# Patient Record
Sex: FEMALE | Race: WHITE | URBAN - METROPOLITAN AREA
[De-identification: names, ages, dates, MRNs, and addresses within clinical notes are randomized per-mention and may not be internally consistent; named-entity substitution may affect disease eponyms.]

---

## 2022-04-20 ENCOUNTER — APPOINTMENT (RX ONLY)
Dept: URBAN - METROPOLITAN AREA CLINIC 341 | Facility: CLINIC | Age: 62
Setting detail: DERMATOLOGY
End: 2022-04-20

## 2022-04-20 DIAGNOSIS — L50.8 OTHER URTICARIA: ICD-10-CM | Status: INADEQUATELY CONTROLLED

## 2022-04-20 DIAGNOSIS — L82.1 OTHER SEBORRHEIC KERATOSIS: ICD-10-CM

## 2022-04-20 DIAGNOSIS — L73.8 OTHER SPECIFIED FOLLICULAR DISORDERS: ICD-10-CM

## 2022-04-20 PROCEDURE — ? PRESCRIPTION

## 2022-04-20 PROCEDURE — 99204 OFFICE O/P NEW MOD 45 MIN: CPT

## 2022-04-20 PROCEDURE — ? COUNSELING

## 2022-04-20 PROCEDURE — ? PRESCRIPTION MEDICATION MANAGEMENT

## 2022-04-20 PROCEDURE — ? TREATMENT REGIMEN

## 2022-04-20 RX ORDER — TRIAMCINOLONE ACETONIDE 1 MG/G
CREAM TOPICAL
Qty: 80 | Refills: 3 | Status: ERX | COMMUNITY
Start: 2022-04-20

## 2022-04-20 RX ADMIN — TRIAMCINOLONE ACETONIDE: 1 CREAM TOPICAL at 00:00

## 2022-04-20 ASSESSMENT — LOCATION SIMPLE DESCRIPTION DERM
LOCATION SIMPLE: LEFT FOREHEAD
LOCATION SIMPLE: LEFT FOREARM
LOCATION SIMPLE: RIGHT FOREHEAD
LOCATION SIMPLE: LEFT CHEEK
LOCATION SIMPLE: RIGHT UPPER ARM
LOCATION SIMPLE: RIGHT NOSE
LOCATION SIMPLE: ABDOMEN
LOCATION SIMPLE: LEFT UPPER ARM
LOCATION SIMPLE: RIGHT FOREARM

## 2022-04-20 ASSESSMENT — LOCATION DETAILED DESCRIPTION DERM
LOCATION DETAILED: LEFT CENTRAL MALAR CHEEK
LOCATION DETAILED: RIGHT FOREHEAD
LOCATION DETAILED: LEFT VENTRAL PROXIMAL FOREARM
LOCATION DETAILED: LEFT LATERAL ABDOMEN
LOCATION DETAILED: RIGHT NASAL SIDEWALL
LOCATION DETAILED: RIGHT ANTERIOR DISTAL UPPER ARM
LOCATION DETAILED: LEFT ANTERIOR DISTAL UPPER ARM
LOCATION DETAILED: LEFT FOREHEAD
LOCATION DETAILED: RIGHT VENTRAL PROXIMAL FOREARM

## 2022-04-20 ASSESSMENT — LOCATION ZONE DERM
LOCATION ZONE: TRUNK
LOCATION ZONE: FACE
LOCATION ZONE: NOSE
LOCATION ZONE: ARM

## 2022-04-20 NOTE — PROCEDURE: TREATMENT REGIMEN
Detail Level: Zone
Otc Regimen: Recommended taking daily antihistamine in the morning and a Benadryl at night

## 2022-04-20 NOTE — PROCEDURE: PRESCRIPTION MEDICATION MANAGEMENT
Initiate Treatment: Triamcinolone cream- Apply to affected areas of the body twice daily as needed for flares
Detail Level: Zone
Render In Strict Bullet Format?: No

## 2022-05-18 ENCOUNTER — APPOINTMENT (RX ONLY)
Dept: URBAN - METROPOLITAN AREA CLINIC 341 | Facility: CLINIC | Age: 62
Setting detail: DERMATOLOGY
End: 2022-05-18

## 2022-05-18 DIAGNOSIS — D22 MELANOCYTIC NEVI: ICD-10-CM

## 2022-05-18 DIAGNOSIS — D18.0 HEMANGIOMA: ICD-10-CM

## 2022-05-18 DIAGNOSIS — L50.8 OTHER URTICARIA: ICD-10-CM | Status: WELL CONTROLLED

## 2022-05-18 DIAGNOSIS — D485 NEOPLASM OF UNCERTAIN BEHAVIOR OF SKIN: ICD-10-CM | Status: INADEQUATELY CONTROLLED

## 2022-05-18 DIAGNOSIS — L81.4 OTHER MELANIN HYPERPIGMENTATION: ICD-10-CM

## 2022-05-18 DIAGNOSIS — L30.4 ERYTHEMA INTERTRIGO: ICD-10-CM | Status: WELL CONTROLLED

## 2022-05-18 DIAGNOSIS — L73.8 OTHER SPECIFIED FOLLICULAR DISORDERS: ICD-10-CM | Status: INADEQUATELY CONTROLLED

## 2022-05-18 DIAGNOSIS — I87.2 VENOUS INSUFFICIENCY (CHRONIC) (PERIPHERAL): ICD-10-CM | Status: STABLE

## 2022-05-18 DIAGNOSIS — L82.1 OTHER SEBORRHEIC KERATOSIS: ICD-10-CM

## 2022-05-18 PROBLEM — D18.01 HEMANGIOMA OF SKIN AND SUBCUTANEOUS TISSUE: Status: ACTIVE | Noted: 2022-05-18

## 2022-05-18 PROBLEM — D22.5 MELANOCYTIC NEVI OF TRUNK: Status: ACTIVE | Noted: 2022-05-18

## 2022-05-18 PROBLEM — D48.5 NEOPLASM OF UNCERTAIN BEHAVIOR OF SKIN: Status: ACTIVE | Noted: 2022-05-18

## 2022-05-18 PROCEDURE — 99213 OFFICE O/P EST LOW 20 MIN: CPT | Mod: 25

## 2022-05-18 PROCEDURE — ? TREATMENT REGIMEN

## 2022-05-18 PROCEDURE — ? PRESCRIPTION MEDICATION MANAGEMENT

## 2022-05-18 PROCEDURE — 11102 TANGNTL BX SKIN SINGLE LES: CPT

## 2022-05-18 PROCEDURE — ? OTC TREATMENT REGIMEN

## 2022-05-18 PROCEDURE — ? COUNSELING

## 2022-05-18 PROCEDURE — ? BIOPSY BY SHAVE METHOD

## 2022-05-18 ASSESSMENT — LOCATION DETAILED DESCRIPTION DERM
LOCATION DETAILED: LEFT ANTERIOR DISTAL UPPER ARM
LOCATION DETAILED: LEFT SUPERIOR MEDIAL UPPER BACK
LOCATION DETAILED: RIGHT VENTRAL PROXIMAL FOREARM
LOCATION DETAILED: LEFT DISTAL PRETIBIAL REGION
LOCATION DETAILED: PERIUMBILICAL SKIN
LOCATION DETAILED: LEFT FOREHEAD
LOCATION DETAILED: RIGHT INFERIOR UPPER BACK
LOCATION DETAILED: LEFT PROXIMAL CALF
LOCATION DETAILED: RIGHT NASAL SIDEWALL
LOCATION DETAILED: RIGHT INGUINAL CREASE
LOCATION DETAILED: LEFT CENTRAL MALAR CHEEK
LOCATION DETAILED: RIGHT FOREHEAD
LOCATION DETAILED: RIGHT ANTERIOR DISTAL UPPER ARM
LOCATION DETAILED: LEFT VENTRAL PROXIMAL FOREARM
LOCATION DETAILED: RIGHT INFERIOR MEDIAL MIDBACK
LOCATION DETAILED: SUBXIPHOID

## 2022-05-18 ASSESSMENT — LOCATION SIMPLE DESCRIPTION DERM
LOCATION SIMPLE: LEFT PRETIBIAL REGION
LOCATION SIMPLE: LEFT UPPER ARM
LOCATION SIMPLE: RIGHT UPPER ARM
LOCATION SIMPLE: RIGHT UPPER BACK
LOCATION SIMPLE: RIGHT FOREHEAD
LOCATION SIMPLE: RIGHT NOSE
LOCATION SIMPLE: GROIN
LOCATION SIMPLE: LEFT CALF
LOCATION SIMPLE: ABDOMEN
LOCATION SIMPLE: LEFT UPPER BACK
LOCATION SIMPLE: LEFT FOREHEAD
LOCATION SIMPLE: RIGHT LOWER BACK
LOCATION SIMPLE: RIGHT FOREARM
LOCATION SIMPLE: LEFT CHEEK
LOCATION SIMPLE: LEFT FOREARM

## 2022-05-18 ASSESSMENT — LOCATION ZONE DERM
LOCATION ZONE: TRUNK
LOCATION ZONE: FACE
LOCATION ZONE: LEG
LOCATION ZONE: ARM
LOCATION ZONE: NOSE

## 2022-05-18 NOTE — PROCEDURE: TREATMENT REGIMEN
Detail Level: Zone
Otc Regimen: Continue taking daily antihistamine (Claritin)
Continue Regimen: Compression socks recommended by PCP
Initiate Treatment: Triamcinolone- apply nightly to affected areas of legs

## 2022-05-18 NOTE — PROCEDURE: PRESCRIPTION MEDICATION MANAGEMENT
Discontinue Regimen: Triamcinolone acetonide 0.1 % topical cream-Apply to affected areas of the body twice daily as needed for flares\\n*Restart as needed for flares
Detail Level: Zone
Render In Strict Bullet Format?: No
Continue Regimen: Nystop 100,000 unit/gm Powder (prescribed by Dr. Qing Stevens)

## 2022-05-18 NOTE — PROCEDURE: OTC TREATMENT REGIMEN
Detail Level: Zone
Patient Specific Otc Recommendations (Will Not Stick From Patient To Patient): Differin Gel (Adapalene)-Apply pea size amount to full face every night after cleansing

## 2022-06-15 ENCOUNTER — APPOINTMENT (RX ONLY)
Dept: URBAN - METROPOLITAN AREA CLINIC 341 | Facility: CLINIC | Age: 62
Setting detail: DERMATOLOGY
End: 2022-06-15

## 2022-06-15 DIAGNOSIS — D22 MELANOCYTIC NEVI: ICD-10-CM | Status: RESOLVED

## 2022-06-15 PROBLEM — D22.72 MELANOCYTIC NEVI OF LEFT LOWER LIMB, INCLUDING HIP: Status: ACTIVE | Noted: 2022-06-15

## 2022-06-15 PROCEDURE — ? COUNSELING

## 2022-06-15 PROCEDURE — 99212 OFFICE O/P EST SF 10 MIN: CPT

## 2022-06-15 PROCEDURE — ? ADDITIONAL NOTES

## 2022-06-15 ASSESSMENT — LOCATION ZONE DERM: LOCATION ZONE: LEG

## 2022-06-15 ASSESSMENT — LOCATION SIMPLE DESCRIPTION DERM: LOCATION SIMPLE: LEFT CALF

## 2022-06-15 ASSESSMENT — LOCATION DETAILED DESCRIPTION DERM: LOCATION DETAILED: LEFT PROXIMAL CALF

## 2022-06-15 NOTE — PROCEDURE: ADDITIONAL NOTES
Additional Notes: 6/2022 - Clinically clear, will monitor for re-pigmentation. [id number] FBE x 2 years Additional Notes: 6/2022 - Clinically clear, will monitor for re-pigmentation. [id number] FBE x 2 years

## 2023-01-18 ENCOUNTER — RX ONLY (OUTPATIENT)
Age: 63
Setting detail: RX ONLY
End: 2023-01-18

## 2023-01-18 ENCOUNTER — APPOINTMENT (RX ONLY)
Dept: URBAN - METROPOLITAN AREA CLINIC 341 | Facility: CLINIC | Age: 63
Setting detail: DERMATOLOGY
End: 2023-01-18

## 2023-01-18 DIAGNOSIS — L29.8 OTHER PRURITUS: ICD-10-CM | Status: INADEQUATELY CONTROLLED

## 2023-01-18 DIAGNOSIS — D18.0 HEMANGIOMA: ICD-10-CM

## 2023-01-18 DIAGNOSIS — L29.89 OTHER PRURITUS: ICD-10-CM | Status: INADEQUATELY CONTROLLED

## 2023-01-18 DIAGNOSIS — D22 MELANOCYTIC NEVI: ICD-10-CM

## 2023-01-18 DIAGNOSIS — L81.4 OTHER MELANIN HYPERPIGMENTATION: ICD-10-CM

## 2023-01-18 DIAGNOSIS — Z87.2 PERSONAL HISTORY OF DISEASES OF THE SKIN AND SUBCUTANEOUS TISSUE: ICD-10-CM | Status: STABLE

## 2023-01-18 DIAGNOSIS — L82.1 OTHER SEBORRHEIC KERATOSIS: ICD-10-CM

## 2023-01-18 DIAGNOSIS — L73.8 OTHER SPECIFIED FOLLICULAR DISORDERS: ICD-10-CM | Status: STABLE

## 2023-01-18 PROBLEM — D22.5 MELANOCYTIC NEVI OF TRUNK: Status: ACTIVE | Noted: 2023-01-18

## 2023-01-18 PROBLEM — D18.01 HEMANGIOMA OF SKIN AND SUBCUTANEOUS TISSUE: Status: ACTIVE | Noted: 2023-01-18

## 2023-01-18 PROCEDURE — ? OTC TREATMENT REGIMEN

## 2023-01-18 PROCEDURE — ? ADDITIONAL NOTES

## 2023-01-18 PROCEDURE — ? COUNSELING

## 2023-01-18 PROCEDURE — 99213 OFFICE O/P EST LOW 20 MIN: CPT

## 2023-01-18 PROCEDURE — ? TREATMENT REGIMEN

## 2023-01-18 PROCEDURE — ? PRESCRIPTION MEDICATION MANAGEMENT

## 2023-01-18 RX ORDER — TRIAMCINOLONE ACETONIDE 1 MG/G
CREAM TOPICAL
Qty: 80 | Refills: 3 | Status: ERX

## 2023-01-18 ASSESSMENT — LOCATION ZONE DERM
LOCATION ZONE: NOSE
LOCATION ZONE: LEG
LOCATION ZONE: TRUNK
LOCATION ZONE: FACE

## 2023-01-18 ASSESSMENT — LOCATION SIMPLE DESCRIPTION DERM
LOCATION SIMPLE: LEFT CHEEK
LOCATION SIMPLE: RIGHT UPPER BACK
LOCATION SIMPLE: RIGHT NOSE
LOCATION SIMPLE: LEFT CALF
LOCATION SIMPLE: UPPER BACK
LOCATION SIMPLE: LEFT FOREHEAD
LOCATION SIMPLE: RIGHT LOWER BACK
LOCATION SIMPLE: RIGHT FOREHEAD

## 2023-01-18 ASSESSMENT — LOCATION DETAILED DESCRIPTION DERM
LOCATION DETAILED: LEFT CENTRAL MALAR CHEEK
LOCATION DETAILED: RIGHT MEDIAL UPPER BACK
LOCATION DETAILED: LEFT PROXIMAL CALF
LOCATION DETAILED: RIGHT SUPERIOR UPPER BACK
LOCATION DETAILED: RIGHT SUPERIOR MEDIAL MIDBACK
LOCATION DETAILED: RIGHT FOREHEAD
LOCATION DETAILED: RIGHT NASAL SIDEWALL
LOCATION DETAILED: RIGHT MID-UPPER BACK
LOCATION DETAILED: LEFT FOREHEAD
LOCATION DETAILED: INFERIOR THORACIC SPINE

## 2023-01-18 NOTE — PROCEDURE: PRESCRIPTION MEDICATION MANAGEMENT
Render In Strict Bullet Format?: No
Detail Level: Zone
Continue Regimen: Triamcinolone- apply to the afflicted areas twice daily x 2 weeks, then as needed for flares (refilled from previous Rx)

## 2023-01-18 NOTE — PROCEDURE: ADDITIONAL NOTES
Additional Notes: 6/2022 - Clinically clear, will monitor for re-pigmentation. [id number] FBE x 2 years Additional Notes: 6/2022 - Clinically clear, will monitor for re-pigmentation. [id number] FBE x 2 years

## 2023-07-11 ENCOUNTER — APPOINTMENT (RX ONLY)
Dept: URBAN - METROPOLITAN AREA CLINIC 341 | Facility: CLINIC | Age: 63
Setting detail: DERMATOLOGY
End: 2023-07-11

## 2023-07-11 DIAGNOSIS — D18.0 HEMANGIOMA: ICD-10-CM | Status: STABLE

## 2023-07-11 DIAGNOSIS — D485 NEOPLASM OF UNCERTAIN BEHAVIOR OF SKIN: ICD-10-CM | Status: INADEQUATELY CONTROLLED

## 2023-07-11 DIAGNOSIS — T07XXXA INSECT BITE, NONVENOMOUS, OF OTHER, MULTIPLE, AND UNSPECIFIED SITES, WITHOUT MENTION OF INFECTION: ICD-10-CM | Status: INADEQUATELY CONTROLLED

## 2023-07-11 PROBLEM — D18.01 HEMANGIOMA OF SKIN AND SUBCUTANEOUS TISSUE: Status: ACTIVE | Noted: 2023-07-11

## 2023-07-11 PROBLEM — S00.06XA INSECT BITE (NONVENOMOUS) OF SCALP, INITIAL ENCOUNTER: Status: ACTIVE | Noted: 2023-07-11

## 2023-07-11 PROBLEM — D48.5 NEOPLASM OF UNCERTAIN BEHAVIOR OF SKIN: Status: ACTIVE | Noted: 2023-07-11

## 2023-07-11 PROCEDURE — 11102 TANGNTL BX SKIN SINGLE LES: CPT

## 2023-07-11 PROCEDURE — ? PRESCRIPTION MEDICATION MANAGEMENT

## 2023-07-11 PROCEDURE — 99213 OFFICE O/P EST LOW 20 MIN: CPT | Mod: 25

## 2023-07-11 PROCEDURE — ? COUNSELING

## 2023-07-11 PROCEDURE — ? BIOPSY BY SHAVE METHOD

## 2023-07-11 PROCEDURE — ? OTHER

## 2023-07-11 PROCEDURE — ? PRESCRIPTION

## 2023-07-11 RX ORDER — CLOBETASOL PROPIONATE 0.5 MG/ML
SOLUTION TOPICAL
Qty: 25 | Refills: 0 | Status: ERX | COMMUNITY
Start: 2023-07-11

## 2023-07-11 RX ADMIN — CLOBETASOL PROPIONATE: 0.5 SOLUTION TOPICAL at 00:00

## 2023-07-11 ASSESSMENT — LOCATION ZONE DERM
LOCATION ZONE: TRUNK
LOCATION ZONE: SCALP

## 2023-07-11 ASSESSMENT — LOCATION SIMPLE DESCRIPTION DERM
LOCATION SIMPLE: LEFT UPPER BACK
LOCATION SIMPLE: CHEST
LOCATION SIMPLE: POSTERIOR SCALP

## 2023-07-11 ASSESSMENT — LOCATION DETAILED DESCRIPTION DERM
LOCATION DETAILED: POSTERIOR MID-PARIETAL SCALP
LOCATION DETAILED: LEFT MEDIAL SUPERIOR CHEST
LOCATION DETAILED: LEFT SUPERIOR UPPER BACK

## 2023-07-11 NOTE — PROCEDURE: OTHER
Note Text (......Xxx Chief Complaint.): This diagnosis correlates with the
Other (Free Text): Photo taken today
Detail Level: Zone
Render Risk Assessment In Note?: no

## 2023-07-11 NOTE — PROCEDURE: PRESCRIPTION MEDICATION MANAGEMENT
Render In Strict Bullet Format?: No
Detail Level: Zone
Initiate Treatment: Clobetasol scalp solution apply taa 1-2 x daily

## 2025-03-20 ENCOUNTER — NEW PATIENT COMPREHENSIVE (OUTPATIENT)
Dept: URBAN - METROPOLITAN AREA CLINIC 59 | Facility: CLINIC | Age: 65
End: 2025-03-20

## 2025-03-20 DIAGNOSIS — H11.041: ICD-10-CM

## 2025-03-20 DIAGNOSIS — H40.013: ICD-10-CM

## 2025-03-20 DIAGNOSIS — Z83.511: ICD-10-CM

## 2025-03-20 DIAGNOSIS — H04.123: ICD-10-CM

## 2025-03-20 DIAGNOSIS — H02.31: ICD-10-CM

## 2025-03-20 DIAGNOSIS — H25.813: ICD-10-CM

## 2025-03-20 DIAGNOSIS — H02.34: ICD-10-CM

## 2025-03-20 PROCEDURE — 92133 CPTRZD OPH DX IMG PST SGM ON: CPT

## 2025-03-20 PROCEDURE — 99204 OFFICE O/P NEW MOD 45 MIN: CPT

## 2025-03-20 ASSESSMENT — TONOMETRY
OS_IOP_MMHG: 19
OD_IOP_MMHG: 19

## 2025-03-20 ASSESSMENT — VISUAL ACUITY
OS_PH: 20/40
OD_CC: 20/25
OS_CC: 20/100

## 2025-04-04 ENCOUNTER — CONSULTATION (OUTPATIENT)
Dept: URBAN - METROPOLITAN AREA CLINIC 59 | Facility: CLINIC | Age: 65
End: 2025-04-04

## 2025-04-04 DIAGNOSIS — H25.813: ICD-10-CM

## 2025-04-04 PROCEDURE — 76516 ECHO EXAM OF EYE: CPT

## 2025-04-04 PROCEDURE — 92136 OPHTHALMIC BIOMETRY: CPT

## 2025-04-04 ASSESSMENT — VISUAL ACUITY
OS_SC: 20/100
OD_PH: 20/25+2
OS_CC: 20/50
OS_PH: 20/20-1
OD_CC: 20/25+2
OD_SC: 20/80+1

## 2025-04-04 ASSESSMENT — INTRAOCULAR PRESSURE
OD: 0
OS: 0

## 2025-04-16 ENCOUNTER — 1 DAY POST-OP (OUTPATIENT)
Dept: URBAN - METROPOLITAN AREA CLINIC 59 | Facility: CLINIC | Age: 65
End: 2025-04-16

## 2025-04-16 DIAGNOSIS — Z96.1: ICD-10-CM

## 2025-04-16 PROCEDURE — 99024 POSTOP FOLLOW-UP VISIT: CPT

## 2025-04-16 ASSESSMENT — VISUAL ACUITY
OS_SC: 20/20-1
OD_PH: 20/25-2
OD_SC: 20/80-2

## 2025-04-16 ASSESSMENT — TONOMETRY
OS_IOP_MMHG: 27
OD_IOP_MMHG: 20

## 2025-05-07 ENCOUNTER — 1 DAY POST-OP (OUTPATIENT)
Dept: URBAN - METROPOLITAN AREA CLINIC 22 | Facility: CLINIC | Age: 65
End: 2025-05-07

## 2025-05-07 DIAGNOSIS — Z96.1: ICD-10-CM

## 2025-05-07 PROCEDURE — 99024 POSTOP FOLLOW-UP VISIT: CPT

## 2025-05-07 ASSESSMENT — TONOMETRY
OD_IOP_MMHG: 22
OS_IOP_MMHG: 18

## 2025-05-07 ASSESSMENT — VISUAL ACUITY
OS_SC: 20/30-2
OS_PH: 20/20
OD_SC: 20/25

## 2025-05-15 ENCOUNTER — POST-OP CHECK (OUTPATIENT)
Dept: URBAN - METROPOLITAN AREA CLINIC 59 | Facility: CLINIC | Age: 65
End: 2025-05-15

## 2025-05-15 DIAGNOSIS — Z96.1: ICD-10-CM

## 2025-05-15 PROCEDURE — 99024 POSTOP FOLLOW-UP VISIT: CPT

## 2025-05-15 ASSESSMENT — TONOMETRY
OS_IOP_MMHG: 22
OD_IOP_MMHG: 22

## 2025-06-20 ENCOUNTER — POST-OP CHECK (OUTPATIENT)
Dept: URBAN - METROPOLITAN AREA CLINIC 59 | Facility: CLINIC | Age: 65
End: 2025-06-20

## 2025-06-20 DIAGNOSIS — Z96.1: ICD-10-CM

## 2025-06-20 PROCEDURE — 99024 POSTOP FOLLOW-UP VISIT: CPT

## 2025-06-20 ASSESSMENT — VISUAL ACUITY
OS_SC: 20/20
OD_SC: 20/20-1

## 2025-06-20 ASSESSMENT — TONOMETRY
OD_IOP_MMHG: 13
OS_IOP_MMHG: 14